# Patient Record
Sex: FEMALE | Race: WHITE | NOT HISPANIC OR LATINO
[De-identification: names, ages, dates, MRNs, and addresses within clinical notes are randomized per-mention and may not be internally consistent; named-entity substitution may affect disease eponyms.]

---

## 2019-01-23 PROBLEM — Z00.00 ENCOUNTER FOR PREVENTIVE HEALTH EXAMINATION: Status: ACTIVE | Noted: 2019-01-23

## 2019-02-06 ENCOUNTER — APPOINTMENT (OUTPATIENT)
Dept: GASTROENTEROLOGY | Facility: CLINIC | Age: 39
End: 2019-02-06
Payer: COMMERCIAL

## 2019-02-06 VITALS
HEIGHT: 65 IN | BODY MASS INDEX: 20.83 KG/M2 | DIASTOLIC BLOOD PRESSURE: 70 MMHG | SYSTOLIC BLOOD PRESSURE: 112 MMHG | WEIGHT: 125 LBS

## 2019-02-06 DIAGNOSIS — K59.00 CONSTIPATION, UNSPECIFIED: ICD-10-CM

## 2019-02-06 DIAGNOSIS — R19.7 DIARRHEA, UNSPECIFIED: ICD-10-CM

## 2019-02-06 DIAGNOSIS — R10.11 RIGHT UPPER QUADRANT PAIN: ICD-10-CM

## 2019-02-06 DIAGNOSIS — Z80.0 FAMILY HISTORY OF MALIGNANT NEOPLASM OF DIGESTIVE ORGANS: ICD-10-CM

## 2019-02-06 DIAGNOSIS — R19.4 CHANGE IN BOWEL HABIT: ICD-10-CM

## 2019-02-06 PROCEDURE — 99244 OFF/OP CNSLTJ NEW/EST MOD 40: CPT

## 2019-02-06 RX ORDER — LEVONORGESTREL 52 MG/1
20 INTRAUTERINE DEVICE INTRAUTERINE
Refills: 0 | Status: ACTIVE | COMMUNITY

## 2019-02-10 NOTE — HISTORY OF PRESENT ILLNESS
[FreeTextEntry1] : 39 yo f seen at the request of Flaquita Prajapati NP here for evaluation of abd pain,  alternating constipation/diarrhea, family h/o crc\par \par She has had difficulty with BM  since childhood, constipated as a child. she would always feel stress in her GI tract. \par \par since summer [place]/July 2018 , she noted increase in gas and right sided abd pain, especially after eating.  Abd US at [facility] was performed and it was reportedly normal. \par \par she cut out dairy and gluten, mild improvement in gas/ and pain. \par \par she started having diarrhea with loose stools daily in the am for 1 month in Oct.2018 sometimes a/w pain other times not. \par no brbpr/melena. \par started Metamucil in Nov. 2018 and BM normalized. \par \par she is now eating gluten. \par no hb/reflux. \par good appetite, no weight loss. \par nausea 1-2x per month, mild. \par labs with PMD mid nov reportedly normal.  \par 2 weeks ago RUQ pain returned. she felt better after bm. pain a/w nausea. \par RUQ pain can last for days, mild. constant. \par \par \par She had similar episode 8 years ago, saw GI on [facility] , stool test and US were normal. \par \par mirena placed in 03/2018\par She has not had prior EGD or colonoscopy.

## 2019-02-10 NOTE — ASSESSMENT
[FreeTextEntry1] : New RUQ pain, often post prandial. Previous US reportedly normal. Recommend EGD to r/o PUD and MRCP to r/o sludge/stones. \par \par Change in bowel habits a/w new abdominal pain and family h/o crc, recommend colonoscopy to r/o colitis/mass.

## 2019-02-10 NOTE — PHYSICAL EXAM
[General Appearance - Alert] : alert [General Appearance - In No Acute Distress] : in no acute distress [Sclera] : the sclera and conjunctiva were normal [Outer Ear] : the ears and nose were normal in appearance [Hearing Threshold Finger Rub Not [place]] : hearing was normal [Neck Appearance] : the appearance of the neck was normal [] : no respiratory distress [Apical Impulse] : the apical impulse was normal [Abdomen Soft] : soft [Abdomen Tenderness] : non-tender [Abnormal Walk] : normal gait [Skin Color & Pigmentation] : normal skin color and pigmentation [No Focal Deficits] : no focal deficits [Oriented To Time, Place, And Person] : oriented to person, place, and time [FreeTextEntry1] : deferred to upcoming colonoscopy

## 2019-07-12 ENCOUNTER — RESULT REVIEW (OUTPATIENT)
Age: 39
End: 2019-07-12

## 2020-05-23 ENCOUNTER — TRANSCRIPTION ENCOUNTER (OUTPATIENT)
Age: 40
End: 2020-05-23

## 2020-05-26 ENCOUNTER — TRANSCRIPTION ENCOUNTER (OUTPATIENT)
Age: 40
End: 2020-05-26

## 2020-06-24 ENCOUNTER — RESULT REVIEW (OUTPATIENT)
Age: 40
End: 2020-06-24

## 2021-04-19 ENCOUNTER — TRANSCRIPTION ENCOUNTER (OUTPATIENT)
Age: 41
End: 2021-04-19

## 2022-03-31 ENCOUNTER — RESULT REVIEW (OUTPATIENT)
Age: 42
End: 2022-03-31

## 2022-06-09 ENCOUNTER — NON-APPOINTMENT (OUTPATIENT)
Age: 42
End: 2022-06-09

## 2023-06-22 ENCOUNTER — NON-APPOINTMENT (OUTPATIENT)
Age: 43
End: 2023-06-22

## 2023-08-30 ENCOUNTER — NON-APPOINTMENT (OUTPATIENT)
Age: 43
End: 2023-08-30

## 2024-02-21 ENCOUNTER — NON-APPOINTMENT (OUTPATIENT)
Age: 44
End: 2024-02-21

## 2024-06-23 ENCOUNTER — NON-APPOINTMENT (OUTPATIENT)
Age: 44
End: 2024-06-23

## 2024-10-30 ENCOUNTER — NON-APPOINTMENT (OUTPATIENT)
Age: 44
End: 2024-10-30